# Patient Record
Sex: MALE | Race: NATIVE HAWAIIAN OR OTHER PACIFIC ISLANDER | Employment: FULL TIME | URBAN - METROPOLITAN AREA
[De-identification: names, ages, dates, MRNs, and addresses within clinical notes are randomized per-mention and may not be internally consistent; named-entity substitution may affect disease eponyms.]

---

## 2017-02-22 ENCOUNTER — APPOINTMENT (OUTPATIENT)
Dept: GENERAL RADIOLOGY | Facility: HOSPITAL | Age: 47
End: 2017-02-22
Payer: COMMERCIAL

## 2017-02-22 ENCOUNTER — HOSPITAL ENCOUNTER (EMERGENCY)
Facility: HOSPITAL | Age: 47
Discharge: HOME | End: 2017-02-22
Payer: COMMERCIAL

## 2017-02-22 ENCOUNTER — APPOINTMENT (OUTPATIENT)
Dept: CT IMAGING | Facility: HOSPITAL | Age: 47
End: 2017-02-22
Attending: PHYSICIAN ASSISTANT
Payer: COMMERCIAL

## 2017-02-22 VITALS
OXYGEN SATURATION: 98 % | HEIGHT: 68 IN | RESPIRATION RATE: 18 BRPM | SYSTOLIC BLOOD PRESSURE: 118 MMHG | WEIGHT: 222 LBS | DIASTOLIC BLOOD PRESSURE: 86 MMHG | BODY MASS INDEX: 33.65 KG/M2 | HEART RATE: 88 BPM | TEMPERATURE: 98 F

## 2017-02-22 DIAGNOSIS — R31.9 HEMATURIA, UNSPECIFIED: ICD-10-CM

## 2017-02-22 DIAGNOSIS — R53.83 FATIGUE, UNSPECIFIED TYPE: Primary | ICD-10-CM

## 2017-02-22 LAB
ALBUMIN SERPL BCP-MCNC: 4 G/DL (ref 3.5–4.8)
ALP SERPL-CCNC: 63 U/L (ref 32–100)
ALT SERPL-CCNC: 27 U/L (ref 17–63)
ANION GAP SERPL CALC-SCNC: 7 MMOL/L (ref 0–18)
AST SERPL-CCNC: 24 U/L (ref 15–41)
BASOPHILS # BLD: 0 K/UL (ref 0–0.2)
BASOPHILS NFR BLD: 0 %
BILIRUB DIRECT SERPL-MCNC: 0.2 MG/DL (ref 0–0.2)
BILIRUB SERPL-MCNC: 1.4 MG/DL (ref 0.3–1.2)
BILIRUB UR QL: NEGATIVE
BUN SERPL-MCNC: 9 MG/DL (ref 8–20)
BUN/CREAT SERPL: 9.2 (ref 10–20)
CALCIUM SERPL-MCNC: 9.4 MG/DL (ref 8.5–10.5)
CHLORIDE SERPL-SCNC: 107 MMOL/L (ref 95–110)
CLARITY UR: CLEAR
CO2 SERPL-SCNC: 27 MMOL/L (ref 22–32)
COLOR UR: YELLOW
CREAT SERPL-MCNC: 0.98 MG/DL (ref 0.5–1.5)
EOSINOPHIL # BLD: 0.1 K/UL (ref 0–0.7)
EOSINOPHIL NFR BLD: 2 %
ERYTHROCYTE [DISTWIDTH] IN BLOOD BY AUTOMATED COUNT: 13.2 % (ref 11–15)
GLUCOSE SERPL-MCNC: 102 MG/DL (ref 70–99)
GLUCOSE UR-MCNC: NEGATIVE MG/DL
HCT VFR BLD AUTO: 48 % (ref 41–52)
HGB BLD-MCNC: 16.4 G/DL (ref 13.5–17.5)
HGB UR QL STRIP.AUTO: NEGATIVE
KETONES UR-MCNC: NEGATIVE MG/DL
LEUKOCYTE ESTERASE UR QL STRIP.AUTO: NEGATIVE
LYMPHOCYTES # BLD: 1.5 K/UL (ref 1–4)
LYMPHOCYTES NFR BLD: 34 %
MCH RBC QN AUTO: 29.5 PG (ref 27–32)
MCHC RBC AUTO-ENTMCNC: 34.1 G/DL (ref 32–37)
MCV RBC AUTO: 86.4 FL (ref 80–100)
MONOCYTES # BLD: 0.4 K/UL (ref 0–1)
MONOCYTES NFR BLD: 9 %
NEUTROPHILS # BLD AUTO: 2.4 K/UL (ref 1.8–7.7)
NEUTROPHILS NFR BLD: 55 %
NITRITE UR QL STRIP.AUTO: NEGATIVE
OSMOLALITY UR CALC.SUM OF ELEC: 291 MOSM/KG (ref 275–295)
PH UR: 7 [PH] (ref 5–8)
PLATELET # BLD AUTO: 197 K/UL (ref 140–400)
PMV BLD AUTO: 9.3 FL (ref 7.4–10.3)
POTASSIUM SERPL-SCNC: 4.3 MMOL/L (ref 3.3–5.1)
PROT SERPL-MCNC: 7.2 G/DL (ref 5.9–8.4)
PROT UR-MCNC: NEGATIVE MG/DL
RBC # BLD AUTO: 5.55 M/UL (ref 4.5–5.9)
SODIUM SERPL-SCNC: 141 MMOL/L (ref 136–144)
SP GR UR STRIP: 1.02 (ref 1–1.03)
TROPONIN I SERPL-MCNC: 0 NG/ML (ref ?–0.03)
TSH SERPL-ACNC: 0.53 UIU/ML (ref 0.34–5.6)
UROBILINOGEN UR STRIP-ACNC: <2
VIT C UR-MCNC: NEGATIVE MG/DL
WBC # BLD AUTO: 4.4 K/UL (ref 4–11)

## 2017-02-22 PROCEDURE — 71020 XR CHEST PA + LAT CHEST (CPT=71020): CPT

## 2017-02-22 PROCEDURE — 93010 ELECTROCARDIOGRAM REPORT: CPT | Performed by: INTERNAL MEDICINE

## 2017-02-22 PROCEDURE — 80076 HEPATIC FUNCTION PANEL: CPT

## 2017-02-22 PROCEDURE — 80048 BASIC METABOLIC PNL TOTAL CA: CPT

## 2017-02-22 PROCEDURE — 36415 COLL VENOUS BLD VENIPUNCTURE: CPT

## 2017-02-22 PROCEDURE — 74177 CT ABD & PELVIS W/CONTRAST: CPT

## 2017-02-22 PROCEDURE — 84443 ASSAY THYROID STIM HORMONE: CPT | Performed by: PHYSICIAN ASSISTANT

## 2017-02-22 PROCEDURE — 85025 COMPLETE CBC W/AUTO DIFF WBC: CPT

## 2017-02-22 PROCEDURE — 84484 ASSAY OF TROPONIN QUANT: CPT | Performed by: PHYSICIAN ASSISTANT

## 2017-02-22 PROCEDURE — 99284 EMERGENCY DEPT VISIT MOD MDM: CPT

## 2017-02-22 PROCEDURE — 81003 URINALYSIS AUTO W/O SCOPE: CPT | Performed by: PHYSICIAN ASSISTANT

## 2017-02-22 PROCEDURE — 93005 ELECTROCARDIOGRAM TRACING: CPT

## 2017-02-22 NOTE — ED INITIAL ASSESSMENT (HPI)
Pt states he experienced fatigue chills and lightheaded today no appetite seen own fmd in [place] last week and pina MCDERMOTT 2 wks ago and has been told all text is negative states pt

## 2017-02-22 NOTE — ED PROVIDER NOTES
Patient Seen in: [facility] Emergency Department    History   Patient presents with:  Fatigue (constitutional, neurologic)    Stated Complaint: fatigue    The history is provided by the patient and the spouse.      52 yom with no pmhx c/o onset of Temporal   SpO2 02/22/17 1304 95 %   O2 Device 02/22/17 1304 None (Room air)       Current:/80 mmHg  Pulse 63  Temp(Src) 98 °F (36.7 °C) (Temporal)  Resp 18  Ht 172.7 cm (5' 8\")  Wt 100.699 kg  BMI 33.76 kg/m2  SpO2 96%        Physical Exam   Consti 9.4 8.5-10.5 mg/dL   BUN/CREA Ratio 9.2 (L) 10.0-20.0   Anion Gap 7 0-18   Calculated Osmolality 291 275-295 mOsm/kg   GFR, Non-African American >60 >=60   GFR, -American >60 >=60   -HEPATIC FUNCTION PANEL (7)   Collection Time: 02/22/17  1:27 PM LAT CHEST (CPT=71020) (Final result) Result time: 02/22/17 14:23:32     Final result by Kiel Martinez MD (02/22/17 14:23:32)     Impression:     CONCLUSION:      No focal airspace consolidation. If symptoms persist, CT chest is recommended.      CT ABDO

## 2017-02-22 NOTE — ED INITIAL ASSESSMENT (HPI)
C/O feeling general fatigue for 2 months.  Recent travel to [place] [address] from [facility] for further eval

## 2024-10-16 ENCOUNTER — HOSPITAL ENCOUNTER (EMERGENCY)
Facility: HOSPITAL | Age: 54
Discharge: HOME | End: 2024-10-16
Attending: EMERGENCY MEDICINE
Payer: COMMERCIAL

## 2024-10-16 VITALS
WEIGHT: 223 LBS | DIASTOLIC BLOOD PRESSURE: 79 MMHG | BODY MASS INDEX: 33.8 KG/M2 | OXYGEN SATURATION: 95 % | SYSTOLIC BLOOD PRESSURE: 129 MMHG | TEMPERATURE: 98 F | RESPIRATION RATE: 16 BRPM | HEIGHT: 68 IN | HEART RATE: 59 BPM

## 2024-10-16 DIAGNOSIS — R51.9 ACUTE NONINTRACTABLE HEADACHE, UNSPECIFIED HEADACHE TYPE: Primary | ICD-10-CM

## 2024-10-16 PROCEDURE — 96375 TX/PRO/DX INJ NEW DRUG ADDON: CPT

## 2024-10-16 PROCEDURE — 96374 THER/PROPH/DIAG INJ IV PUSH: CPT

## 2024-10-16 PROCEDURE — 96361 HYDRATE IV INFUSION ADD-ON: CPT

## 2024-10-16 PROCEDURE — 99284 EMERGENCY DEPT VISIT MOD MDM: CPT

## 2024-10-16 RX ORDER — METOCLOPRAMIDE 10 MG/1
10 TABLET ORAL 3 TIMES DAILY PRN
Qty: 10 TABLET | Refills: 0 | Status: SHIPPED | OUTPATIENT
Start: 2024-10-16 | End: 2024-11-15

## 2024-10-16 RX ORDER — METOCLOPRAMIDE HYDROCHLORIDE 5 MG/ML
10 INJECTION INTRAMUSCULAR; INTRAVENOUS ONCE
Status: COMPLETED | OUTPATIENT
Start: 2024-10-16 | End: 2024-10-16

## 2024-10-16 RX ORDER — KETOROLAC TROMETHAMINE 30 MG/ML
30 INJECTION, SOLUTION INTRAMUSCULAR; INTRAVENOUS ONCE
Status: COMPLETED | OUTPATIENT
Start: 2024-10-16 | End: 2024-10-16

## 2024-10-16 RX ORDER — DIPHENHYDRAMINE HYDROCHLORIDE 50 MG/ML
12.5 INJECTION INTRAMUSCULAR; INTRAVENOUS ONCE
Status: COMPLETED | OUTPATIENT
Start: 2024-10-16 | End: 2024-10-16

## 2024-10-16 RX ORDER — DEXAMETHASONE SODIUM PHOSPHATE 4 MG/ML
10 VIAL (ML) INJECTION ONCE
Status: COMPLETED | OUTPATIENT
Start: 2024-10-16 | End: 2024-10-16

## 2024-10-16 NOTE — ED PROVIDER NOTES
Patient Seen in: [facility] Emergency Department      History     Chief Complaint   Patient presents with    Headache     Stated Complaint: headache x 1 week    Subjective:   HPI      54-year-old male who denies previous medical history presents to the emergency department for evaluation of headache.  Patient reports headache since Monday.  Was seen at [facility], had scan which she was told was normal, discharged with ondansetron for headache.  Patient reports he is also tried tramadol from his primary care without relief.  He has tried Tylenol and ibuprofen without relief.  He did try taking ketorolac from [place] with some relief of his pain.  Denies fever, chills, neck pain, recent trauma, focal weakness or numbness, vomiting.  Headache is worse with light.    Objective:     No pertinent past medical history.            No pertinent past surgical history.              No pertinent social history.                Physical Exam     ED Triage Vitals [10/16/24 0916]   /83   Pulse 69   Resp 20   Temp 98.4 °F (36.9 °C)   Temp src Temporal   SpO2 95 %   O2 Device None (Room air)       Current Vitals:   Vital Signs  BP: 129/79  Pulse: 59  Resp: 16  Temp: 98.4 °F (36.9 °C)  Temp src: Temporal  MAP (mmHg): 94    Oxygen Therapy  SpO2: 95 %  O2 Device: None (Room air)        Physical Exam  Vitals and nursing note reviewed.   Constitutional:       General: He is not in acute distress.     Appearance: He is well-developed.   HENT:      Head: Normocephalic and atraumatic.   Eyes:      Extraocular Movements: Extraocular movements intact.      Conjunctiva/sclera: Conjunctivae normal.      Pupils: Pupils are equal, round, and reactive to light.   Cardiovascular:      Rate and Rhythm: Normal rate and regular rhythm.      Heart sounds: Normal heart sounds.   Pulmonary:      Effort: Pulmonary effort is normal. No respiratory distress.      Breath sounds: Normal breath sounds.   Abdominal:      General: Bowel sounds are  normal.      Palpations: Abdomen is soft.      Tenderness: There is no abdominal tenderness.   Musculoskeletal:         General: Normal range of motion.      Cervical back: Normal range of motion and neck supple.   Skin:     General: Skin is warm and dry.      Findings: No rash.   Neurological:      General: No focal deficit present.      Mental Status: He is alert and oriented to person, place, and time.      Cranial Nerves: No cranial nerve deficit.      Sensory: No sensory deficit.      Motor: No weakness.             ED Course   Labs Reviewed - No data to display         Imaging Results Available and Reviewed while in ED:   No orders to display       ED Medications Administered:   Medications   metoclopramide (Reglan) 5 mg/mL injection 10 mg (10 mg Intravenous Given 10/16/24 1030)   diphenhydrAMINE (Benadryl) 50 mg/mL  injection 12.5 mg (12.5 mg Intravenous Given 10/16/24 1032)   sodium chloride 0.9 % IV bolus 1,000 mL (0 mL Intravenous Stopped 10/16/24 1145)   dexamethasone (Decadron) 4 MG/ML injection 10 mg (10 mg Intravenous Given 10/16/24 1030)   ketorolac (Toradol) 30 MG/ML injection 30 mg (30 mg Intravenous Given 10/16/24 1034)       Vitals:    10/16/24 1030 10/16/24 1100 10/16/24 1130 10/16/24 1230   BP: 127/83 121/77 117/78 129/79   Pulse: 58 54 57 59   Resp: 16 16     Temp:       TempSrc:       SpO2: 97% 95% 95% 95%   Weight:       Height:         *I personally reviewed and interpreted all ED vitals.         MDM              Medical Decision Making  Differential diagnosis includes but is not limited to tension headache, sinusitis, cluster headache, less likely malignancy, intracranial hemorrhage, subarachnoid hemorrhage    Patient with progressively worsening headache, denies sudden or thunderclap headache, history of headaches in the past but is typically resolved with Tylenol or ibuprofen.  Normal neurovascular exam, low suspicion for intracranial etiology especially in light of normal CT from 2 days  ago.  Patient feeling much better after IV fluids, Toradol, Reglan, Benadryl.  Reports headache has resolved and he would like to be discharged home.  Discharged with supportive care and outpatient follow-up as well as return precautions.  He verbalizes understanding of and agreement with this plan.    Problems Addressed:  Acute nonintractable headache, unspecified headache type: acute illness or injury    Amount and/or Complexity of Data Reviewed  External Data Reviewed: radiology.     Details: CT brain read from 10/14/2024 reviewed, noted to have \"no intracranial hemorrhage or evidence for acute process\"    Risk  OTC drugs.  Prescription drug management.        Disposition and Plan     Clinical Impression:  1. Acute nonintractable headache, unspecified headache type         Disposition:  Discharge  10/16/2024 12:39 pm    Follow-up:  Johny Richmond  [address] [place] [address] [id number] [phone]    Schedule an appointment as soon as possible for a visit in 1 week(s)  For follow up          Medications Prescribed:  Discharge Medication List as of 10/16/2024 12:45 PM        START taking these medications    Details   metoclopramide 10 MG Oral Tab Take 1 tablet (10 mg total) by mouth 3 (three) times daily as needed (headache)., Normal, Disp-10 tablet, R-0                 Supplementary Documentation:

## 2024-10-16 NOTE — DISCHARGE INSTRUCTIONS
Stay hydrated, get rest.    Take Tylenol and/or ibuprofen at the for sign of headache.    You can take the prescription prescribed you today as described.    See primary care within the next week for follow-up appointment.    Return to the ER if develop worsening symptoms, weakness or numbness on one side of the body, slurred speech, sudden severe headache, or any emergent concerns

## 2024-10-16 NOTE — ED INITIAL ASSESSMENT (HPI)
Patient is here with a headache x 1 week. Patient denies history of migraines.  Patient states going to [facility] where he had CT scan of his head & blood draw, but was only discharged with antinausea meds. Patient states that he is not nauseous or voting. Denies blurry vision. Denies fevers.  Patient states that Tylenol or prescribed Tramadol didn't help him for pain. This morning patient took Ketoraloco form [place] that helped him, though patient states that the pain tends to come back.

## 2024-10-16 NOTE — ED QUICK NOTES
Assumed care of patient at this time.   Patient is resting on stretcher, fluids infusing.   Patient has no current needs at this time.

## 2024-10-20 ENCOUNTER — HOSPITAL ENCOUNTER (EMERGENCY)
Facility: HOSPITAL | Age: 54
Discharge: LEFT BEFORE DISCHARGE | End: 2024-10-20
Payer: COMMERCIAL

## 2024-10-20 VITALS
DIASTOLIC BLOOD PRESSURE: 92 MMHG | BODY MASS INDEX: 34.1 KG/M2 | SYSTOLIC BLOOD PRESSURE: 146 MMHG | TEMPERATURE: 98 F | RESPIRATION RATE: 18 BRPM | HEART RATE: 67 BPM | HEIGHT: 68 IN | WEIGHT: 225 LBS | OXYGEN SATURATION: 97 %

## 2024-10-20 NOTE — ED INITIAL ASSESSMENT (HPI)
PT c/o persistent headache for the last week, seen multiple times including at this facility on Wednesday, states tylenol has not been helping today with his headache.

## (undated) NOTE — ED AVS SNAPSHOT
[facility] Emergency Department    [address]     [place] [address]    Phone:  [phone]    Fax:  [address]   MRN: [id number]    Department:  [facility] Emergency Department   Date of Visit:  2/22 and Class Registration line at [phone] or find a doctor online by visiting [email or web address].    IF THERE IS ANY CHANGE OR WORSENING OF YOUR CONDITION, CALL YOUR PRIMARY CARE PHYSICIAN AT ONCE OR RETURN IMMEDIATELY TO [address] [place] [address].     If

## (undated) NOTE — ED AVS SNAPSHOT
[facility] Emergency Department    [address]     [place] [address]    Phone:  [phone]    Fax:  [address]   MRN: [id number]    Department:  [facility] Emergency Department   Date of Visit:  2/22 If you have difficulty scheduling your follow-up appointment as directed, please call our  at [phone]. Si tiene problemas para programar kyung abrahan de seguimiento según lo indicado, llame al encargado de violeta al [phone].     It i continue to take your medications as instructed by your Primary Care doctor until you can check with your doctor. Please bring the medication list to your next doctor's appointment.     Any imaging studies and labs completed today can be reviewed in your [place] plans. To get signed up and covered, please call [phone] and ask to get set up for an insurance coverage that is in-network with [facility]. Anastasia     Sign up for [employer], your secure online medical record.   Kleen Extreme wi